# Patient Record
Sex: FEMALE | Race: ASIAN | NOT HISPANIC OR LATINO | URBAN - METROPOLITAN AREA
[De-identification: names, ages, dates, MRNs, and addresses within clinical notes are randomized per-mention and may not be internally consistent; named-entity substitution may affect disease eponyms.]

---

## 2018-07-03 ENCOUNTER — EMERGENCY (EMERGENCY)
Facility: HOSPITAL | Age: 23
LOS: 1 days | Discharge: HOME | End: 2018-07-03
Attending: EMERGENCY MEDICINE | Admitting: EMERGENCY MEDICINE
Payer: COMMERCIAL

## 2018-07-03 VITALS
RESPIRATION RATE: 16 BRPM | TEMPERATURE: 99 F | DIASTOLIC BLOOD PRESSURE: 68 MMHG | SYSTOLIC BLOOD PRESSURE: 114 MMHG | HEART RATE: 85 BPM | OXYGEN SATURATION: 100 %

## 2018-07-03 VITALS
TEMPERATURE: 99 F | RESPIRATION RATE: 16 BRPM | HEART RATE: 85 BPM | SYSTOLIC BLOOD PRESSURE: 134 MMHG | OXYGEN SATURATION: 100 % | DIASTOLIC BLOOD PRESSURE: 86 MMHG

## 2018-07-03 DIAGNOSIS — F33.9 MAJOR DEPRESSIVE DISORDER, RECURRENT, UNSPECIFIED: ICD-10-CM

## 2018-07-03 LAB
ALBUMIN SERPL ELPH-MCNC: 4.7 G/DL — SIGNIFICANT CHANGE UP (ref 3.3–5)
ALP SERPL-CCNC: 50 U/L — SIGNIFICANT CHANGE UP (ref 40–120)
ALT FLD-CCNC: 12 U/L — SIGNIFICANT CHANGE UP (ref 4–33)
AMPHET UR-MCNC: NEGATIVE — SIGNIFICANT CHANGE UP
APAP SERPL-MCNC: < 15 UG/ML — LOW (ref 15–25)
APPEARANCE UR: CLEAR — SIGNIFICANT CHANGE UP
AST SERPL-CCNC: 16 U/L — SIGNIFICANT CHANGE UP (ref 4–32)
BACTERIA # UR AUTO: SIGNIFICANT CHANGE UP
BARBITURATES UR SCN-MCNC: NEGATIVE — SIGNIFICANT CHANGE UP
BASOPHILS # BLD AUTO: 0.02 K/UL — SIGNIFICANT CHANGE UP (ref 0–0.2)
BASOPHILS NFR BLD AUTO: 0.6 % — SIGNIFICANT CHANGE UP (ref 0–2)
BENZODIAZ UR-MCNC: NEGATIVE — SIGNIFICANT CHANGE UP
BILIRUB SERPL-MCNC: 0.5 MG/DL — SIGNIFICANT CHANGE UP (ref 0.2–1.2)
BILIRUB UR-MCNC: NEGATIVE — SIGNIFICANT CHANGE UP
BLOOD UR QL VISUAL: NEGATIVE — SIGNIFICANT CHANGE UP
BUN SERPL-MCNC: 15 MG/DL — SIGNIFICANT CHANGE UP (ref 7–23)
CALCIUM SERPL-MCNC: 9.2 MG/DL — SIGNIFICANT CHANGE UP (ref 8.4–10.5)
CANNABINOIDS UR-MCNC: NEGATIVE — SIGNIFICANT CHANGE UP
CHLORIDE SERPL-SCNC: 103 MMOL/L — SIGNIFICANT CHANGE UP (ref 98–107)
CO2 SERPL-SCNC: 29 MMOL/L — SIGNIFICANT CHANGE UP (ref 22–31)
COCAINE METAB.OTHER UR-MCNC: NEGATIVE — SIGNIFICANT CHANGE UP
COLOR SPEC: YELLOW — SIGNIFICANT CHANGE UP
CREAT SERPL-MCNC: 0.72 MG/DL — SIGNIFICANT CHANGE UP (ref 0.5–1.3)
EOSINOPHIL # BLD AUTO: 0.04 K/UL — SIGNIFICANT CHANGE UP (ref 0–0.5)
EOSINOPHIL NFR BLD AUTO: 1.1 % — SIGNIFICANT CHANGE UP (ref 0–6)
ETHANOL BLD-MCNC: < 10 MG/DL — SIGNIFICANT CHANGE UP
GLUCOSE SERPL-MCNC: 99 MG/DL — SIGNIFICANT CHANGE UP (ref 70–99)
GLUCOSE UR-MCNC: NEGATIVE — SIGNIFICANT CHANGE UP
HCG UR-SCNC: NEGATIVE — SIGNIFICANT CHANGE UP
HCT VFR BLD CALC: 40.6 % — SIGNIFICANT CHANGE UP (ref 34.5–45)
HGB BLD-MCNC: 13.8 G/DL — SIGNIFICANT CHANGE UP (ref 11.5–15.5)
IMM GRANULOCYTES # BLD AUTO: 0.01 # — SIGNIFICANT CHANGE UP
IMM GRANULOCYTES NFR BLD AUTO: 0.3 % — SIGNIFICANT CHANGE UP (ref 0–1.5)
KETONES UR-MCNC: NEGATIVE — SIGNIFICANT CHANGE UP
LEUKOCYTE ESTERASE UR-ACNC: SIGNIFICANT CHANGE UP
LYMPHOCYTES # BLD AUTO: 0.92 K/UL — LOW (ref 1–3.3)
LYMPHOCYTES # BLD AUTO: 26.4 % — SIGNIFICANT CHANGE UP (ref 13–44)
MCHC RBC-ENTMCNC: 29.9 PG — SIGNIFICANT CHANGE UP (ref 27–34)
MCHC RBC-ENTMCNC: 34 % — SIGNIFICANT CHANGE UP (ref 32–36)
MCV RBC AUTO: 88.1 FL — SIGNIFICANT CHANGE UP (ref 80–100)
METHADONE UR-MCNC: NEGATIVE — SIGNIFICANT CHANGE UP
MONOCYTES # BLD AUTO: 0.26 K/UL — SIGNIFICANT CHANGE UP (ref 0–0.9)
MONOCYTES NFR BLD AUTO: 7.5 % — SIGNIFICANT CHANGE UP (ref 2–14)
MUCOUS THREADS # UR AUTO: SIGNIFICANT CHANGE UP
NEUTROPHILS # BLD AUTO: 2.23 K/UL — SIGNIFICANT CHANGE UP (ref 1.8–7.4)
NEUTROPHILS NFR BLD AUTO: 64.1 % — SIGNIFICANT CHANGE UP (ref 43–77)
NITRITE UR-MCNC: NEGATIVE — SIGNIFICANT CHANGE UP
NRBC # FLD: 0 — SIGNIFICANT CHANGE UP
OPIATES UR-MCNC: NEGATIVE — SIGNIFICANT CHANGE UP
OXYCODONE UR-MCNC: NEGATIVE — SIGNIFICANT CHANGE UP
PCP UR-MCNC: NEGATIVE — SIGNIFICANT CHANGE UP
PH UR: 6.5 — SIGNIFICANT CHANGE UP (ref 4.6–8)
PLATELET # BLD AUTO: 290 K/UL — SIGNIFICANT CHANGE UP (ref 150–400)
PMV BLD: 9.6 FL — SIGNIFICANT CHANGE UP (ref 7–13)
POTASSIUM SERPL-MCNC: 4.9 MMOL/L — SIGNIFICANT CHANGE UP (ref 3.5–5.3)
POTASSIUM SERPL-SCNC: 4.9 MMOL/L — SIGNIFICANT CHANGE UP (ref 3.5–5.3)
PROT SERPL-MCNC: 8 G/DL — SIGNIFICANT CHANGE UP (ref 6–8.3)
PROT UR-MCNC: 30 MG/DL — HIGH
RBC # BLD: 4.61 M/UL — SIGNIFICANT CHANGE UP (ref 3.8–5.2)
RBC # FLD: 11.9 % — SIGNIFICANT CHANGE UP (ref 10.3–14.5)
RBC CASTS # UR COMP ASSIST: SIGNIFICANT CHANGE UP (ref 0–?)
SALICYLATES SERPL-MCNC: < 5 MG/DL — LOW (ref 15–30)
SODIUM SERPL-SCNC: 142 MMOL/L — SIGNIFICANT CHANGE UP (ref 135–145)
SP GR SPEC: 1.03 — SIGNIFICANT CHANGE UP (ref 1–1.04)
TSH SERPL-MCNC: 1 UIU/ML — SIGNIFICANT CHANGE UP (ref 0.27–4.2)
UROBILINOGEN FLD QL: 1 MG/DL — SIGNIFICANT CHANGE UP
WBC # BLD: 3.48 K/UL — LOW (ref 3.8–10.5)
WBC # FLD AUTO: 3.48 K/UL — LOW (ref 3.8–10.5)
WBC UR QL: SIGNIFICANT CHANGE UP (ref 0–?)

## 2018-07-03 PROCEDURE — 90792 PSYCH DIAG EVAL W/MED SRVCS: CPT

## 2018-07-03 PROCEDURE — 99284 EMERGENCY DEPT VISIT MOD MDM: CPT
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
## 2018-07-03 NOTE — ED PROVIDER NOTE - CARE PLAN
Principal Discharge DX:	Recurrent major depressive disorder, remission status unspecified  Secondary Diagnosis:	Depression, unspecified depression type
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
## 2018-07-03 NOTE — ED BEHAVIORAL HEALTH ASSESSMENT NOTE - MODIFICATIONS
patient seen and evaluated for key parts of exam with resident physician, agree with assessment and plan above

## 2018-07-03 NOTE — ED BEHAVIORAL HEALTH ASSESSMENT NOTE - CASE SUMMARY
the patient is a 19 year old female with symptoms of depression, with suicide attempt interrupted last night of high lethality with psychotic features (ideas of reference, people are laughing at her, internal preoccupation, thought blocking), who requires involuntary admission for safety and stabilization.  although effexor/risperidone isn't an evidence based approach to psychotic depression per say, she has tolerated effexor in the past, making is the most reasonable antidepressant, and risperidone has evidence as an adjuntive agent in case series supporting it's use, if not randomized controlled trials.  olanzapine/fluoxetine combination, which has more evidence, would have a higher metabolic risk burden for the patient, and wouldn't have the benefit of the established tolerability of venlafaxine in this patient, thus it's selection.  aripiprazole, due to it's risk of akathisia with rapid titration, was felt to be less suitable in an already extremely psychomotor agitated patient who is already at extremely at high risk for completed suicide, and adding akathisia to the picture would raise the risk profile unnecessarily.

## 2018-07-03 NOTE — ED BEHAVIORAL HEALTH ASSESSMENT NOTE - OTHER PAST PSYCHIATRIC HISTORY (INCLUDE DETAILS REGARDING ONSET, COURSE OF ILLNESS, INPATIENT/OUTPATIENT TREATMENT)
See HPI See HPI     Ms. Munguia states that the patient was seeing psychologist Leslie Schafer ([phone]) but has not seen her since last December. Past diagnosis of MDD, 3 past psychiatric hospitalizations, 1 at [facility] in 2013 after suicide attempt by overdose, 1 at [facility] in September 2013 after patient was reportedly found about to ingest bleach and most recent admission at [facility] in 8/2015 as documented history of suicide attempt of attempting to drink toilet bowl . interrupted by parents which lead to admission on 8/15/15.  Ms. Munguia states that the patient was seeing psychologist Leslie Schafer ([phone]) but has not seen her since last December.

## 2018-07-03 NOTE — ED BEHAVIORAL HEALTH ASSESSMENT NOTE - FAMILY DETAILS
lives with mother, father and 2 younger siblings lives with mother, father and 2 younger siblings (brother and sister)

## 2018-07-03 NOTE — ED BEHAVIORAL HEALTH ASSESSMENT NOTE - SUICIDE PROTECTIVE FACTORS
Fear of death or dying due to pain/suffering Responsibility to family and others/Future oriented/Fear of death or dying due to pain/suffering/Identifies reasons for living
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
## 2018-07-03 NOTE — ED BEHAVIORAL HEALTH ASSESSMENT NOTE - SUICIDE RISK FACTORS
Agitation/severe anxiety/Global insomnia/Anhedonia/Hopelessness/Mood episode/Other/Unable to engage in safety planning Agitation/severe anxiety

## 2018-07-03 NOTE — ED PROVIDER NOTE - PROGRESS NOTE DETAILS
AJM: pt presented to dr leblanc of  who will eval pt. AJM: pt signed out to Psych JENNIE aMrk, pending BH eval JENNIE Mark chart reviewed with DR OLIVA Medically cleared psych cleared updated for dispo CLARYM: pt seen by dr leblanc and cleared for dc home

## 2018-07-03 NOTE — ED BEHAVIORAL HEALTH ASSESSMENT NOTE - DIFFERENTIAL
major depressive disorder, severe with psychotic features vs psychosis nos major depressive disorder, severe; r/o borderline personality disorder

## 2018-07-03 NOTE — ED BEHAVIORAL HEALTH ASSESSMENT NOTE - DESCRIPTION
Denies domiciled with parents and siblings, unemployed tearful and upset    Vital Signs Last 24 Hrs  T(C): 37.1 (03 Jul 2018 09:33), Max: 37.1 (03 Jul 2018 09:33)  T(F): 98.7 (03 Jul 2018 09:33), Max: 98.7 (03 Jul 2018 09:33)  HR: 85 (03 Jul 2018 09:33) (85 - 85)  BP: 134/86 (03 Jul 2018 09:33) (134/86 - 134/86)  BP(mean): --  RR: 16 (03 Jul 2018 09:33) (16 - 16)  SpO2: 100% (03 Jul 2018 09:33) (100% - 100%) calm, cooperative    Vital Signs Last 24 Hrs  T(C): 37.1 (03 Jul 2018 09:33), Max: 37.1 (03 Jul 2018 09:33)  T(F): 98.7 (03 Jul 2018 09:33), Max: 98.7 (03 Jul 2018 09:33)  HR: 85 (03 Jul 2018 09:33) (85 - 85)  BP: 134/86 (03 Jul 2018 09:33) (134/86 - 134/86)  BP(mean): --  RR: 16 (03 Jul 2018 09:33) (16 - 16)  SpO2: 100% (03 Jul 2018 09:33) (100% - 100%) calm, cooperative, no psychomotor abnormalities, no agitation, no prns required    Vital Signs Last 24 Hrs  T(C): 37.1 (03 Jul 2018 09:33), Max: 37.1 (03 Jul 2018 09:33)  T(F): 98.7 (03 Jul 2018 09:33), Max: 98.7 (03 Jul 2018 09:33)  HR: 85 (03 Jul 2018 09:33) (85 - 85)  BP: 134/86 (03 Jul 2018 09:33) (134/86 - 134/86)  BP(mean): --  RR: 16 (03 Jul 2018 09:33) (16 - 16)  SpO2: 100% (03 Jul 2018 09:33) (100% - 100%) domiciled with parents and siblings, unemployed but attends [employer]
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
## 2018-07-03 NOTE — ED BEHAVIORAL HEALTH ASSESSMENT NOTE - RISK ASSESSMENT
Very high.  Acute risks include last night's interrupted SA with suicidal note in context of worsening depression/psychosis; Chronic: past h/o SAs, long h/o depression with poor adherence to treatment; Protective factors: supportive family. LOw.  Chronic: past h/o SAs, long h/o depression with poor adherence to treatment; Protective factors: supportive family. Low risk at this time with acute risk factors of recent anxiety/agitation reported at home prior to ER presenation.  Chronic risk factors: past h/o SAs, long h/o depression with poor adherence to treatment.  Precipitating factor is conflictual relationship with mother and lack of outpatient treatment.  Protective factors: supportive family, future oriented, identifies reasons for living including career goals to become a RN. Low risk at this time with acute risk factors of recent anxiety/agitation reported at home prior to ER presentation.  Chronic risk factors: past h/o SAs, long h/o depression with poor adherence to treatment.  Precipitating factor is conflictual relationship with mother and lack of outpatient treatment.  Protective factors: supportive family, future oriented, identifies reasons for living including career goals to become a RN.
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
## 2018-07-03 NOTE — ED PROVIDER NOTE - OBJECTIVE STATEMENT
Patient is a 22 year old female with history of depression and admission to [facility] for depression with psychotic features presenting with aggression. Mom notes over the past few days they have been arguing and her reactions have been inordinate. She states she has been yelling and screaming as well as damaging property in the house. No trauma, drug or etoh use. No history of similar behavior. Pt currently calm and cooperative. No AMS.
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
## 2018-07-03 NOTE — ED BEHAVIORAL HEALTH ASSESSMENT NOTE - REFERRAL / APPOINTMENT DETAILS
Advised to engage in outpatient follow up, agrees to attend [facility] crisis center on Thursday, 7/5 to reengage in treatment

## 2018-07-10 ENCOUNTER — OUTPATIENT (OUTPATIENT)
Dept: OUTPATIENT SERVICES | Facility: HOSPITAL | Age: 23
LOS: 1 days | Discharge: STILL ADMITTED OR ADMINISTRATIVE | End: 2018-07-10

## 2018-07-11 DIAGNOSIS — F33.9 MAJOR DEPRESSIVE DISORDER, RECURRENT, UNSPECIFIED: ICD-10-CM

## 2020-06-26 ENCOUNTER — EMERGENCY (EMERGENCY)
Facility: HOSPITAL | Age: 25
LOS: 1 days | Discharge: HOME | End: 2020-06-26
Admitting: EMERGENCY MEDICINE
Payer: MEDICAID

## 2020-06-26 VITALS
RESPIRATION RATE: 18 BRPM | OXYGEN SATURATION: 100 % | HEART RATE: 114 BPM | TEMPERATURE: 100 F | SYSTOLIC BLOOD PRESSURE: 127 MMHG | DIASTOLIC BLOOD PRESSURE: 88 MMHG

## 2020-06-26 DIAGNOSIS — F60.3 BORDERLINE PERSONALITY DISORDER: ICD-10-CM

## 2020-06-26 DIAGNOSIS — F43.25 ADJUSTMENT DISORDER WITH MIXED DISTURBANCE OF EMOTIONS AND CONDUCT: ICD-10-CM

## 2020-06-26 PROCEDURE — 99284 EMERGENCY DEPT VISIT MOD MDM: CPT

## 2020-06-26 NOTE — ED BEHAVIORAL HEALTH ASSESSMENT NOTE - DESCRIPTION
calm, cooperative, no psychomotor abnormalities, no agitation, no prns required    Vital Signs Last 24 Hrs  T(C): 37.6 (26 Jun 2020 16:38), Max: 37.6 (26 Jun 2020 16:38)  T(F): 99.6 (26 Jun 2020 16:38), Max: 99.6 (26 Jun 2020 16:38)  HR: 114 (26 Jun 2020 16:38) (114 - 114)  BP: 127/88 (26 Jun 2020 16:38) (127/88 - 127/88)  BP(mean): --  RR: 18 (26 Jun 2020 16:38) (18 - 18)  SpO2: 100% (26 Jun 2020 16:38) (100% - 100%) Denies domiciled with parents and siblings, unemployed but attends [employer]
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
## 2020-06-26 NOTE — ED BEHAVIORAL HEALTH ASSESSMENT NOTE - PRIMARY DX
Adjustment disorder with mixed disturbance of emotions and conduct Borderline personality disorder in adult

## 2020-06-26 NOTE — BH SAFETY PLAN - THE ONE THING THAT IS MOST IMPORTANT TO ME AND WORTH LIVING FOR IS:
Detail Level: Zone Initiate Treatment: Solodyn and onexton in the morning and adapalene in the evening Otc Regimen: Benzoyl peroxide lotion My future

## 2020-06-26 NOTE — ED PROVIDER NOTE - CLINICAL SUMMARY MEDICAL DECISION MAKING FREE TEXT BOX
25 y/o M  hx  Depression , Anxiety   Medical evaluation performed. There is no clinical evidence of intoxication or any acute medical problem requiring immediate intervention. Patient is awaiting psychiatric consultation. Final disposition will be determined by psychiatrist. D/C home in company of parents
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
## 2020-06-26 NOTE — ED PROVIDER NOTE - CARE PLAN
Principal Discharge DX:	Borderline personality disorder in adult  Secondary Diagnosis:	Adjustment disorder with mixed disturbance of emotions and conduct

## 2020-06-26 NOTE — ED BEHAVIORAL HEALTH ASSESSMENT NOTE - RISK ASSESSMENT
Low risk at this time with acute risk factors of recent anxiety/agitation reported at home prior to ER presentation.  Chronic risk factors: past h/o SAs, long h/o depression with poor adherence to treatment, history of poor relationship stability and frustration tolerance in context of borderline personality disorder.  Precipitating factor is conflictual relationship with mother and lack of outpatient treatment.  Protective factors: supportive family, future oriented, interested in pursuing psychology, female gender, no access to lethal means. Low Acute Suicide Risk
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
## 2020-06-26 NOTE — ED ADULT NURSE NOTE - OBJECTIVE STATEMENT
Patient arrived to  reporting a bad relationship with parents who called EMS for no particular reason. Patient denies suicidal and homicidal ideation. Patient denies auditory and visual hallucinations. Patient changed into  clothing. Personal property collected and logged. Awaiting evaluation.
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
## 2020-06-26 NOTE — ED BEHAVIORAL HEALTH ASSESSMENT NOTE - OTHER PAST PSYCHIATRIC HISTORY (INCLUDE DETAILS REGARDING ONSET, COURSE OF ILLNESS, INPATIENT/OUTPATIENT TREATMENT)
Past diagnosis of MDD, 3 past psychiatric hospitalizations, 1 at [facility] in 2013 after suicide attempt by overdose, 1 at [facility] in September 2013 after patient was reportedly found about to ingest bleach and most recent admission at [facility] in 8/2015 as documented history of suicide attempt of attempting to drink toilet bowl . interrupted by parents which lead to admission on 8/15/15.  Ms. Munguia states that the patient was seeing psychologist Leslie Schafer ([phone]) but has not been seen in several years.  [facility], transferred to [facility] , provided list of DBT therapists but did not follow-up.

## 2020-06-26 NOTE — ED BEHAVIORAL HEALTH ASSESSMENT NOTE - REFERRAL / APPOINTMENT DETAILS
Advised to engage in outpatient follow up, agrees to attend [facility] crisis center on 06/29/20 to reengage in treatment

## 2020-06-26 NOTE — ED PROVIDER NOTE - NSPTACCESSSVCSAPPT_ED_ALL_ED
Subjective:       Patient ID: Alicia Michele is a 26 y.o. female.    Chief Complaint: Medication Refill    HPI    follow up attention deficit disorder. Chronic condition. She is begin taking over-the-counter supplement called plexus edge. She finds that this actually help with her attention deficit. She doesn't have a letdown in the afternoon like on Vyvanse. She reports no adverse effects.     She does report having difficulty losing weight. Inquired about medication help.    Active Ambulatory Problems     Diagnosis Date Noted    Normal pregnancy, first 01/21/2015    Nausea and vomiting in pregnancy 01/21/2015    Constipation 01/21/2015    ADD (attention deficit disorder) 10/14/2015    Cholelithiasis     Focal nodular hyperplasia of liver 01/27/2016     Resolved Ambulatory Problems     Diagnosis Date Noted    No Resolved Ambulatory Problems     Past Medical History:   Diagnosis Date    Cholelithiasis        Review of Systems   Constitutional: Positive for unexpected weight change.   Psychiatric/Behavioral: Positive for decreased concentration. Negative for dysphoric mood, sleep disturbance and suicidal ideas.       Objective:      Physical Exam   Constitutional: She is oriented to person, place, and time. She appears well-developed and well-nourished. No distress.   HENT:   Head: Normocephalic and atraumatic.   Eyes: No scleral icterus.   Pulmonary/Chest: Effort normal. No respiratory distress.   Neurological: She is alert and oriented to person, place, and time.   Skin: She is not diaphoretic.   Psychiatric: She has a normal mood and affect. Her behavior is normal.   Vitals reviewed.      Assessment:       1. ADD (attention deficit disorder)        Plan:       Alicia was seen today for medication refill.    Diagnoses and all orders for this visit:    ADD (attention deficit disorder)   The supplement is probably working because it has some type of natural stimulant. I advised I don't have any long-term  studies to verify safety and/or efficacy. But she seems to be doing well. Social hold off on the Vyvanse for now but keep me informed.     Regarding weight, of course she will need to follow a healthy diet and can try naltrexone/bupropion follow back in 6-8 weeks  -     Discontinue: naltrexone-bupropion 8-90 mg TbSR; Take 2 tablets by mouth 2 (two) times daily. If this is the first month then follow  or physicians specific instructions.            PCP for Routine Care

## 2020-06-26 NOTE — ED ADULT NURSE REASSESSMENT NOTE - NS ED NURSE REASSESS COMMENT FT1
Pt discharged to home by JENNIE Grigsby. Discharge instructions provided and pt verbalized understanding. Pt’s family will pick her up at ER entrance. Escorted by RN to front entrance. Alert, oriented, and ambulatory upon departure.

## 2020-06-26 NOTE — ED BEHAVIORAL HEALTH ASSESSMENT NOTE - SUICIDE PROTECTIVE FACTORS
Fear of death or the actual act of killing self/Identifies reasons for living/Has future plans/Responsibility to family and others

## 2020-06-26 NOTE — ED PROVIDER NOTE - PATIENT PORTAL LINK FT
You can access the FollowMyHealth Patient Portal offered by [facility] by registering at the following website: [email or web address]. By joining [facility]’s FollowMyHealth portal, you will also be able to view your health information using other applications (apps) compatible with our system.

## 2020-06-27 PROBLEM — F32.9 MAJOR DEPRESSIVE DISORDER, SINGLE EPISODE, UNSPECIFIED: Chronic | Status: ACTIVE | Noted: 2018-07-03

## 2020-06-29 ENCOUNTER — OUTPATIENT (OUTPATIENT)
Dept: OUTPATIENT SERVICES | Facility: HOSPITAL | Age: 25
LOS: 1 days | Discharge: STILL ADMITTED OR ADMINISTRATIVE | End: 2020-06-29
Payer: MEDICAID

## 2020-06-29 PROCEDURE — 90839 PSYTX CRISIS INITIAL 60 MIN: CPT

## 2020-06-30 DIAGNOSIS — F43.20 ADJUSTMENT DISORDER, UNSPECIFIED: ICD-10-CM

## 2020-07-21 NOTE — ED PROVIDER NOTE - CPE EDP CARDIAC NORM
Progress Note  Aliyah Diaz MD    SUBJECTIVE: Patient is seen for Principal Problem:    Acute vertigo with vomiting and inability to stand  Active Problems:    Obstructive sleep apnea    Diabetic autonomic neuropathy associated with type 2 diabetes mellitus ([address]    Dizziness  Resolved Problems:    * No resolved hospital problems. *     pt continues to have nausea,dizziness and photophobia, minimally better    OBJECTIVE:    Vitals:   Vitals:    07/21/20 0655   BP: (!) 161/70   Pulse: 70   Resp: 18   Temp: 97.4 °F (36.3 °C)   SpO2: 97%     Weight: 238 lb 11.2 oz (108.3 kg)   Height: 5' 4\" (162.6 cm)   -----------------------------------------------------------------  Exam:    CONSTITUTIONAL:  awake, alert, cooperative, no apparent distress, and appears stated age  EYES:  Has horizontal nystagmus  ENT:  normocepalic, without obvious abnormality  NECK:  Supple, symmetrical, trachea midline, no adenopathy, thyroid symmetric, not enlarged and no tenderness, skin normal  HEMATOLOGIC/LYMPHATICS:  no cervical lymphadenopathy  LUNGS:  No increased work of breathing, good air exchange, clear to auscultation bilaterally, no crackles or wheezing  CARDIOVASCULAR:  Normal apical impulse, regular rate and rhythm, normal S1 and S2, no S3 or S4, and no murmur noted  ABDOMEN:  No scars, normal bowel sounds, soft, non-distended, non-tender, no masses palpated, no hepatosplenomegally    MUSCULOSKELETAL:  there is no redness, warmth, or swelling of the joints  NEUROLOGIC:  Awake, alert, oriented to name, place and time. Cranial nerves II-XII are grossly intact. Motor is 5 out of 5 bilaterally.  Gait- slow and somewhat unsteady  SKIN:  no bruising or bleeding  EXT:     no cyanosis, clubbing or edema present    -----------------------------------------------------------------  Diagnostic Data: Reviewed    More Recent Labs:    Results for orders placed or performed during the hospital encounter of 07/18/20   CBC Auto Differential Result Value Ref Range    WBC 9.7 3.5 - 11.3 k/uL    RBC 5.13 (H) 3.95 - 5.11 m/uL    Hemoglobin 15.0 11.9 - 15.1 g/dL    Hematocrit 46.2 36.3 - 47.1 %    MCV 90.1 82.6 - 102.9 fL    MCH 29.2 25.2 - 33.5 pg    MCHC 32.5 28.4 - 34.8 g/dL    RDW 13.0 11.8 - 14.4 %    Platelets 872 724 - 261 k/uL    MPV 10.1 8.1 - 13.5 fL    NRBC Automated 0.0 0.0 per 100 WBC    Differential Type NOT REPORTED     Seg Neutrophils 52 36 - 65 %    Lymphocytes 39 24 - 43 %    Monocytes 7 3 - 12 %    Eosinophils % 1 1 - 4 %    Basophils 1 0 - 2 %    Immature Granulocytes 0 0 %    Segs Absolute 5.05 1.50 - 8.10 k/uL    Absolute Lymph # 3.76 (H) 1.10 - 3.70 k/uL    Absolute Mono # 0.66 0.10 - 1.20 k/uL    Absolute Eos # 0.13 0.00 - 0.44 k/uL    Basophils Absolute 0.05 0.00 - 0.20 k/uL    Absolute Immature Granulocyte 0.03 0.00 - 0.30 k/uL    WBC Morphology NOT REPORTED     RBC Morphology NOT REPORTED     Platelet Estimate NOT REPORTED    Basic Metabolic Panel w/ Reflex to MG   Result Value Ref Range    Glucose 271 (H) 70 - 99 mg/dL    BUN 17 6 - 20 mg/dL    CREATININE 0.71 0.50 - 0.90 mg/dL    Bun/Cre Ratio 24 (H) 9 - 20    Calcium 9.5 8.6 - 10.4 mg/dL    Sodium 136 135 - 144 mmol/L    Potassium 4.2 3.7 - 5.3 mmol/L    Chloride 101 98 - 107 mmol/L    CO2 23 20 - 31 mmol/L    Anion Gap 12 9 - 17 mmol/L    GFR Non-African American >60 >60 mL/min    GFR African American >60 >60 mL/min    GFR Comment          GFR Staging         CBC   Result Value Ref Range    WBC 8.5 3.5 - 11.3 k/uL    RBC 4.43 3.95 - 5.11 m/uL    Hemoglobin 13.1 11.9 - 15.1 g/dL    Hematocrit 40.2 36.3 - 47.1 %    MCV 90.7 82.6 - 102.9 fL    MCH 29.6 25.2 - 33.5 pg    MCHC 32.6 28.4 - 34.8 g/dL    RDW 13.1 11.8 - 14.4 %    Platelets 383 243 - 500 k/uL    MPV 9.9 8.1 - 13.5 fL    NRBC Automated 0.0 0.0 per 100 WBC   Hemoglobin A1c   Result Value Ref Range    Hemoglobin A1C 9.2 (H) 4.8 - 5.9 %    Estimated Avg Glucose 217 mg/dL   Glucose, Whole Blood   Result Value Ref Range POC Glucose 241 (H) 74 - 100 mg/dL   Glucose, Whole Blood   Result Value Ref Range    POC Glucose 252 (H) 74 - 100 mg/dL   Glucose, Whole Blood   Result Value Ref Range    POC Glucose 216 (H) 74 - 100 mg/dL   Glucose, Whole Blood   Result Value Ref Range    POC Glucose 237 (H) 74 - 100 mg/dL   Glucose, Whole Blood   Result Value Ref Range    POC Glucose 121 (H) 74 - 100 mg/dL   Glucose, Whole Blood   Result Value Ref Range    POC Glucose 243 (H) 74 - 100 mg/dL   Glucose, Whole Blood   Result Value Ref Range    POC Glucose 250 (H) 74 - 100 mg/dL   Glucose, Whole Blood   Result Value Ref Range    POC Glucose 216 (H) 74 - 100 mg/dL   Glucose, Whole Blood   Result Value Ref Range    POC Glucose 232 (H) 74 - 100 mg/dL   EKG 12 lead   Result Value Ref Range    Ventricular Rate 87 BPM    Atrial Rate 87 BPM    P-R Interval 146 ms    QRS Duration 92 ms    Q-T Interval 384 ms    QTc Calculation (Bazett) 462 ms    P Axis 67 degrees    R Axis 19 degrees    T Axis 38 degrees       ASSESSMENT:   Patient Active Problem List    Diagnosis Date Noted    Dizziness 07/19/2020    Acute vertigo with vomiting and inability to stand 07/19/2020    Sepsis due to abdominal wall cellulitis secondary to DM 09/17/2019    Cellulitis, abdominal wall 09/14/2019    Dysthymia 03/14/2019    Dyspepsia 03/14/2019    Positive FIT (fecal immunochemical test) 03/01/2019    Bilateral leg and foot pain 09/27/2018    Encounter for long-term opiate analgesic use 08/22/2018    Ocular hypertension of left eye 07/16/2018    Regular astigmatism, bilateral 07/16/2018    Vitreous floaters of both eyes 07/16/2018    Cervical radiculitis 06/25/2018    Chronic left shoulder pain 06/25/2018    Complex regional pain syndrome type 1 of left lower extremity 06/25/2018    Cervicalgia 06/25/2018    Diabetic autonomic neuropathy associated with type 2 diabetes mellitus ([facility] 06/25/2018    Obstructive sleep apnea 11/21/2017    Seps[facility] to [facility] 11/21/2017    Vitamin D deficiency 04/19/2017    Type 2 diabetes mellitus with complication, without long-term current use of insulin ([facility] 02/17/2017    Nuclear sclerotic cataract of left eye 10/20/2016    Controlled type 2 diabetes mellitus with diabetic polyneuropathy, without long-term current use of insulin ([facility] 09/01/2016         PLAN:  · Iv fluids  · Vestibular exercises  · Monitor blood sugars  · D/c home if improves      Abhinav Mills M.D. normal...
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
## 2024-12-03 NOTE — ED BEHAVIORAL HEALTH ASSESSMENT NOTE - NS ED BHA BILLING ATTENDING W NP TRAINEE
Is starting a new job and needs a tb test.    Can we just order that to the lab or do you need to see him for an appt?     [id number]